# Patient Record
(demographics unavailable — no encounter records)

---

## 2024-10-28 NOTE — ASSESSMENT
[FreeTextEntry1] : Educated patient about peripheral arterial disease.  Discussed importance of walking and exercise to promote collateral circulation.  Recommended screening with a noninvasive vascular study (Pulse Volume Recording / Ankle Brachial Index). Consider vascular surgery consultation. Discussed with patient at length the importance of glucose control to limit complications. The patient is advised to follow-up with their primary care physician / endocrinologist for labs and the importance of the HbA1c. Discussed proper shoe gear and foot care.  Monitor for any skin color or temperature changes as directed. They are advised to stretch the calf as directed. Discussed etiology and treatment options for the painful fungal toenails including proper hygiene/footcare, topical medications and oral antifungals. All fungal toenails debrided 1,2,3,4,5 right and 1,2,3,4,5 left. Continue the antifungal as prescribed.

## 2024-10-28 NOTE — HISTORY OF PRESENT ILLNESS
[Diabetic Shoes] : diabetic shoes [FreeTextEntry1] : [employer]  is a 87 year old male diagnosed with diabetes in 2010. Patient complaint of elongated toenails difficult to cut on his own

## 2024-10-28 NOTE — PHYSICAL EXAM
[General Appearance - Alert] : alert [General Appearance - In No Acute Distress] : in no acute distress [Ankle Swelling (On Exam)] : present [Ankle Swelling Bilaterally] : bilaterally  [Varicose Veins Of Lower Extremities] : bilaterally [Ankle Swelling On The Right] : mild [] : on both lower extremities [Ankle Swelling On The Left] : moderate [1+] : left foot dorsalis pedis 1+ [FreeTextEntry1] : painful calluses at the plantar 1st b/l and right 5th met heads [Sensation] : the sensory exam was normal to light touch and pinprick [Diminished Throughout Right Foot] : diminished sensation with monofilament testing throughout right foot [Diminished Throughout Left Foot] : diminished sensation with monofilament testing throughout left foot

## 2025-01-13 NOTE — PHYSICAL EXAM
[General Appearance - Alert] : alert [General Appearance - In No Acute Distress] : in no acute distress [Ankle Swelling (On Exam)] : present [Ankle Swelling Bilaterally] : bilaterally  [Varicose Veins Of Lower Extremities] : bilaterally [Ankle Swelling On The Right] : mild [] : on both lower extremities [Ankle Swelling On The Left] : moderate [1+] : left foot dorsalis pedis 1+ [Sensation] : the sensory exam was normal to light touch and pinprick [Diminished Throughout Right Foot] : diminished sensation with monofilament testing throughout right foot [Diminished Throughout Left Foot] : diminished sensation with monofilament testing throughout left foot [Oriented To Time, Place, And Person] : oriented to person, place, and time [de-identified] : limited ankle dorsiflexion  [FreeTextEntry1] : painful calluses at the plantar 1st b/l and right 5th met heads

## 2025-01-13 NOTE — ASSESSMENT
[FreeTextEntry1] : Educated patient about peripheral arterial disease.  Discussed importance of walking and exercise to promote collateral circulation.  Recommended screening with a noninvasive vascular study (Pulse Volume Recording / Ankle Brachial Index). Consider vascular surgery consultation. Discussed with patient at length the importance of glucose control to limit complications. The patient is advised to follow-up with their primary care physician / endocrinologist for labs and the importance of the HbA1c. Discussed proper shoe gear and foot care.  Monitor for any skin color or temperature changes as directed. They are advised to stretch the calf as directed. Discussed etiology and treatment options for the painful fungal toenails including proper hygiene/footcare, topical medications and oral antifungals. All fungal toenails debrided 1,2,3,4,5 right and 1,2,3,4,5 left. Debrided calluses as well b/l feet  Continue the antifungal as prescribed. (Cilkocpirox Cream prescribed by his dermatologist)

## 2025-04-14 NOTE — PHYSICAL EXAM
[General Appearance - Alert] : alert [General Appearance - In No Acute Distress] : in no acute distress [Ankle Swelling (On Exam)] : present [Ankle Swelling Bilaterally] : bilaterally  [Varicose Veins Of Lower Extremities] : bilaterally [Ankle Swelling On The Right] : mild [] : on both lower extremities [Ankle Swelling On The Left] : moderate [1+] : left foot dorsalis pedis 1+ [Sensation] : the sensory exam was normal to light touch and pinprick [Diminished Throughout Right Foot] : diminished sensation with monofilament testing throughout right foot [Diminished Throughout Left Foot] : diminished sensation with monofilament testing throughout left foot [Oriented To Time, Place, And Person] : oriented to person, place, and time [FreeTextEntry3] :  Patient has weak 1/4 dorsalis pedis and 1/4 posterior tibial artery pulses.  Increased temperature gradient and decreased capillary refill time bilaterally.  No acute ischemic changes noted. [de-identified] : limited ankle dorsiflexion  [FreeTextEntry1] : painful calluses at the plantar 1st b/l and right 5th met heads  Toenails are thickened, dystrophic, discolored yellow, painful, elongated and with subungual debris 1,2,3,4,5 bilaterally.